# Patient Record
Sex: FEMALE | Race: WHITE | NOT HISPANIC OR LATINO
[De-identification: names, ages, dates, MRNs, and addresses within clinical notes are randomized per-mention and may not be internally consistent; named-entity substitution may affect disease eponyms.]

---

## 2018-05-24 ENCOUNTER — RECORDS - HEALTHEAST (OUTPATIENT)
Dept: ADMINISTRATIVE | Facility: OTHER | Age: 63
End: 2018-05-24

## 2018-06-20 ENCOUNTER — OFFICE VISIT - HEALTHEAST (OUTPATIENT)
Dept: FAMILY MEDICINE | Facility: CLINIC | Age: 63
End: 2018-06-20

## 2018-06-20 DIAGNOSIS — D12.6 TUBULAR ADENOMA OF COLON: ICD-10-CM

## 2018-06-20 DIAGNOSIS — F32.0 MILD MAJOR DEPRESSION (H): ICD-10-CM

## 2018-06-20 DIAGNOSIS — E78.00 HYPERCHOLESTEROLEMIA: ICD-10-CM

## 2018-06-20 ASSESSMENT — MIFFLIN-ST. JEOR: SCORE: 1205.6

## 2018-06-28 ENCOUNTER — COMMUNICATION - HEALTHEAST (OUTPATIENT)
Dept: FAMILY MEDICINE | Facility: CLINIC | Age: 63
End: 2018-06-28

## 2018-12-24 ENCOUNTER — AMBULATORY - HEALTHEAST (OUTPATIENT)
Dept: NURSING | Facility: CLINIC | Age: 63
End: 2018-12-24

## 2019-09-09 ENCOUNTER — AMBULATORY - HEALTHEAST (OUTPATIENT)
Dept: NURSING | Facility: CLINIC | Age: 64
End: 2019-09-09

## 2020-09-30 ENCOUNTER — RECORDS - HEALTHEAST (OUTPATIENT)
Dept: ADMINISTRATIVE | Facility: OTHER | Age: 65
End: 2020-09-30

## 2020-10-12 ENCOUNTER — COMMUNICATION - HEALTHEAST (OUTPATIENT)
Dept: FAMILY MEDICINE | Facility: CLINIC | Age: 65
End: 2020-10-12

## 2021-06-01 VITALS — HEIGHT: 64 IN | BODY MASS INDEX: 26.29 KG/M2 | WEIGHT: 154 LBS

## 2021-06-16 PROBLEM — E78.00 HYPERCHOLESTEROLEMIA: Status: ACTIVE | Noted: 2018-06-20

## 2021-06-16 PROBLEM — F32.0 MILD MAJOR DEPRESSION (H): Status: ACTIVE | Noted: 2018-06-20

## 2021-06-16 PROBLEM — D12.6 TUBULAR ADENOMA OF COLON: Status: ACTIVE | Noted: 2018-06-20

## 2021-06-18 NOTE — PROGRESS NOTES
"SUBJECTIVE: Hamida Everett is a 63 y.o. female with:  Chief Complaint   Patient presents with     Establish Care     Weight Gain     Need exercise consultation    1) Depressed mood - She has felt depressed lately and does not feel she has anyone to discuss this with.  She has taken SJW for depression in the past and it did help her.  Has not taken any meds.  She feels she is dragging at work and avoiding social situations.  She feels she has to put on a good face but deep down she is feeling poorly.  She has done counseling in the past.  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  He fell a few years ago then developed medical issues so she has more responsibility in the relationship.  She has gained weight and has not been exercising.  She has been overeating.    2) Elevated cholesterol- Last LDL was high but has not had it rechecked.    3) Colon polyps - She had colonoscopy at  in 2013 and had both hyperplastic / adenomatous polyps with no high grade dyplasia.  It was recommended she return in 3 years but she has not gone back.    4) HCM - She had mammogram in May.  She is not interested in pneumonia/ shingles vaccine.  Up to date with tetanus. She had a normal pap in 2010 but no pap since then.    SH: .  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    Patient Active Problem List   Diagnosis     Tubular adenoma of colon     Mild major depression (H)     Hypercholesterolemia     Hip joint replacement status     PVD (posterior vitreous detachment), right eye      OBJECTIVE: /70 (Patient Site: Right Arm, Patient Position: Sitting, Cuff Size: Adult Regular)  Pulse 68  Resp 16  Ht 5' 3.5\" (1.613 m)  Wt 154 lb (69.9 kg)  BMI 26.85 kg/m2 no distress  Psych Exam:  Mood: depressed  Affect: tearful   Behavior: appropriate  ThoughtContent: logical  Judgement: appropriate  Insight: normal   Neuro: AAOx3.  Normal strength and tone.  Normal " gait.    PHQ-2 Total Score: 3 (6/20/2018  1:10 PM)     Lab Results   Component Value Date    CHOL 240 (H) 07/28/2015     Lab Results   Component Value Date    HDL 43 07/28/2015     Lab Results   Component Value Date    LDLCALC 176 (H) 07/28/2015     Lab Results   Component Value Date    TRIG 107 07/28/2015     No components found for: CHOLROMULOL     Hamida was seen today for establish care and weight gain.    Diagnoses and all orders for this visit:    Mild major depression (H)- Will have her restart on [place]'s Wort - handout on dosing given.  Refer for counseling.  F/U if not improved in the next 2 months.  -     Ambulatory referral to Psychology    Tubular adenoma of colon -She is due for colonoscopy and will schedule later this summer.    Hypercholesterolemia- Will recheck FLP this fall at physical.       Patient Instructions   Start on [place]'s Wort for your mood.    Will make a referral for counseling and you will be contacted regarding this.    Follow-up in the fall for physical / pap.    Repeat colonoscopy due this year.    27 minutes spent with the patient.  Over 50% were spent in counseling and coordination of care.     Olive Muñoz

## 2021-08-22 ENCOUNTER — HEALTH MAINTENANCE LETTER (OUTPATIENT)
Age: 66
End: 2021-08-22

## 2021-10-17 ENCOUNTER — HEALTH MAINTENANCE LETTER (OUTPATIENT)
Age: 66
End: 2021-10-17

## 2021-10-19 PROBLEM — F32.9 MAJOR DEPRESSION: Status: ACTIVE | Noted: 2018-06-20

## 2021-11-08 ENCOUNTER — TRANSFERRED RECORDS (OUTPATIENT)
Dept: HEALTH INFORMATION MANAGEMENT | Facility: CLINIC | Age: 66
End: 2021-11-08

## 2021-12-12 ENCOUNTER — HEALTH MAINTENANCE LETTER (OUTPATIENT)
Age: 66
End: 2021-12-12

## 2022-10-02 ENCOUNTER — HEALTH MAINTENANCE LETTER (OUTPATIENT)
Age: 67
End: 2022-10-02

## 2023-02-11 ENCOUNTER — HEALTH MAINTENANCE LETTER (OUTPATIENT)
Age: 68
End: 2023-02-11

## 2023-03-02 ENCOUNTER — TRANSFERRED RECORDS (OUTPATIENT)
Dept: HEALTH INFORMATION MANAGEMENT | Facility: CLINIC | Age: 68
End: 2023-03-02

## 2023-10-21 ENCOUNTER — HEALTH MAINTENANCE LETTER (OUTPATIENT)
Age: 68
End: 2023-10-21

## 2024-03-09 ENCOUNTER — HEALTH MAINTENANCE LETTER (OUTPATIENT)
Age: 69
End: 2024-03-09

## 2025-07-01 ENCOUNTER — TRANSFERRED RECORDS (OUTPATIENT)
Dept: HEALTH INFORMATION MANAGEMENT | Facility: CLINIC | Age: 70
End: 2025-07-01